# Patient Record
Sex: MALE | Race: BLACK OR AFRICAN AMERICAN | Employment: FULL TIME | URBAN - METROPOLITAN AREA
[De-identification: names, ages, dates, MRNs, and addresses within clinical notes are randomized per-mention and may not be internally consistent; named-entity substitution may affect disease eponyms.]

---

## 2020-05-26 ENCOUNTER — HOSPITAL ENCOUNTER (EMERGENCY)
Age: 50
Discharge: HOME | End: 2020-05-26
Attending: EMERGENCY MEDICINE
Payer: COMMERCIAL

## 2020-05-26 ENCOUNTER — APPOINTMENT (OUTPATIENT)
Dept: GENERAL RADIOLOGY | Age: 50
End: 2020-05-26
Attending: EMERGENCY MEDICINE
Payer: COMMERCIAL

## 2020-05-26 VITALS
TEMPERATURE: 98.1 F | BODY MASS INDEX: 32.27 KG/M2 | RESPIRATION RATE: 18 BRPM | DIASTOLIC BLOOD PRESSURE: 72 MMHG | WEIGHT: 265 LBS | SYSTOLIC BLOOD PRESSURE: 152 MMHG | HEART RATE: 67 BPM | OXYGEN SATURATION: 100 % | HEIGHT: 76 IN

## 2020-05-26 DIAGNOSIS — E87.6 HYPOKALEMIA: ICD-10-CM

## 2020-05-26 DIAGNOSIS — R07.89 ATYPICAL CHEST PAIN: Primary | ICD-10-CM

## 2020-05-26 LAB
ALBUMIN SERPL-MCNC: 3.3 G/DL (ref 3.4–5)
ALBUMIN/GLOB SERPL: 0.9 {RATIO} (ref 0.8–1.7)
ALP SERPL-CCNC: 105 U/L (ref 45–117)
ALT SERPL-CCNC: 29 U/L (ref 16–61)
ANION GAP SERPL CALC-SCNC: 7 MMOL/L (ref 3–18)
AST SERPL-CCNC: 13 U/L (ref 10–38)
BASOPHILS # BLD: 0 K/UL (ref 0–0.1)
BASOPHILS NFR BLD: 0 % (ref 0–2)
BILIRUB SERPL-MCNC: 0.4 MG/DL (ref 0.2–1)
BUN SERPL-MCNC: 17 MG/DL (ref 7–18)
BUN/CREAT SERPL: 22 (ref 12–20)
CALCIUM SERPL-MCNC: 8.7 MG/DL (ref 8.5–10.1)
CHLORIDE SERPL-SCNC: 106 MMOL/L (ref 100–111)
CK MB CFR SERPL CALC: NORMAL % (ref 0–4)
CK MB SERPL-MCNC: <1 NG/ML (ref 5–25)
CK SERPL-CCNC: 47 U/L (ref 39–308)
CO2 SERPL-SCNC: 28 MMOL/L (ref 21–32)
CREAT SERPL-MCNC: 0.79 MG/DL (ref 0.6–1.3)
DIFFERENTIAL METHOD BLD: NORMAL
EOSINOPHIL # BLD: 0.1 K/UL (ref 0–0.4)
EOSINOPHIL NFR BLD: 1 % (ref 0–5)
ERYTHROCYTE [DISTWIDTH] IN BLOOD BY AUTOMATED COUNT: 13 % (ref 11.6–14.5)
GLOBULIN SER CALC-MCNC: 3.7 G/DL (ref 2–4)
GLUCOSE SERPL-MCNC: 100 MG/DL (ref 74–99)
HCT VFR BLD AUTO: 44.6 % (ref 36–48)
HGB BLD-MCNC: 14.6 G/DL (ref 13–16)
LYMPHOCYTES # BLD: 3.5 K/UL (ref 0.9–3.6)
LYMPHOCYTES NFR BLD: 31 % (ref 21–52)
MCH RBC QN AUTO: 27.2 PG (ref 24–34)
MCHC RBC AUTO-ENTMCNC: 32.7 G/DL (ref 31–37)
MCV RBC AUTO: 83.1 FL (ref 74–97)
MONOCYTES # BLD: 1.1 K/UL (ref 0.05–1.2)
MONOCYTES NFR BLD: 10 % (ref 3–10)
NEUTS SEG # BLD: 6.5 K/UL (ref 1.8–8)
NEUTS SEG NFR BLD: 58 % (ref 40–73)
PLATELET # BLD AUTO: 268 K/UL (ref 135–420)
PMV BLD AUTO: 9.3 FL (ref 9.2–11.8)
POTASSIUM SERPL-SCNC: 3.3 MMOL/L (ref 3.5–5.5)
PROT SERPL-MCNC: 7 G/DL (ref 6.4–8.2)
RBC # BLD AUTO: 5.37 M/UL (ref 4.7–5.5)
SODIUM SERPL-SCNC: 141 MMOL/L (ref 136–145)
TROPONIN I SERPL-MCNC: <0.02 NG/ML (ref 0–0.04)
WBC # BLD AUTO: 11.3 K/UL (ref 4.6–13.2)

## 2020-05-26 PROCEDURE — 74011250637 HC RX REV CODE- 250/637: Performed by: EMERGENCY MEDICINE

## 2020-05-26 PROCEDURE — 71045 X-RAY EXAM CHEST 1 VIEW: CPT

## 2020-05-26 PROCEDURE — 93005 ELECTROCARDIOGRAM TRACING: CPT

## 2020-05-26 PROCEDURE — 80053 COMPREHEN METABOLIC PANEL: CPT

## 2020-05-26 PROCEDURE — 99284 EMERGENCY DEPT VISIT MOD MDM: CPT

## 2020-05-26 PROCEDURE — 82550 ASSAY OF CK (CPK): CPT

## 2020-05-26 PROCEDURE — 85025 COMPLETE CBC W/AUTO DIFF WBC: CPT

## 2020-05-26 RX ORDER — METOPROLOL TARTRATE 25 MG/1
25 TABLET, FILM COATED ORAL 2 TIMES DAILY
COMMUNITY

## 2020-05-26 RX ORDER — METHIMAZOLE 10 MG/1
10 TABLET ORAL DAILY
COMMUNITY

## 2020-05-26 RX ORDER — POTASSIUM CHLORIDE 1500 MG/1
20 TABLET, FILM COATED, EXTENDED RELEASE ORAL DAILY
Qty: 14 TAB | Refills: 0 | Status: SHIPPED | OUTPATIENT
Start: 2020-05-26 | End: 2020-06-09

## 2020-05-26 RX ORDER — HYDROCHLOROTHIAZIDE 25 MG/1
25 TABLET ORAL DAILY
COMMUNITY

## 2020-05-26 RX ORDER — SIMVASTATIN 40 MG/1
40 TABLET, FILM COATED ORAL
COMMUNITY

## 2020-05-26 RX ADMIN — POTASSIUM BICARBONATE 20 MEQ: 782 TABLET, EFFERVESCENT ORAL at 03:33

## 2020-05-26 NOTE — ED PROVIDER NOTES
EMERGENCY DEPARTMENT HISTORY AND PHYSICAL EXAM    Date: 5/26/2020  Patient Name: Gaurav Arambula    History of Presenting Illness     Chief Complaint   Patient presents with    Chest Pain         History Provided By: Patient    [address]  Kareem Santana is a 54 y.o. male with PMHX of hyperthyroidism, hypertension who presents to the emergency department C/O chest pain. Patient reports sharp left-sided chest pain about 3 hours prior to arrival.  States he was at rest and follow-up sharp chain in lower lateral aspect of his left chest.  Pain did not radiate. Not associated with difficulty breathing. No diaphoresis or nausea. No radiation. He denies prior episodes. Denies drug and tobacco use. PCP: Braxton Serrano, DO    Current Outpatient Medications   Medication Sig Dispense Refill    methIMAzole (TAPAZOLE) 10 mg tablet Take 10 mg by mouth daily.  metoprolol tartrate (LOPRESSOR) 25 mg tablet Take 25 mg by mouth two (2) times a day.  simvastatin (ZOCOR) 40 mg tablet Take 40 mg by mouth nightly.  hydroCHLOROthiazide (HYDRODIURIL) 25 mg tablet Take 25 mg by mouth daily.  potassium chloride SR (K-TAB) 20 mEq tablet Take 1 Tab by mouth daily for 14 days. 14 Tab 0       Past History     Past Medical History:  Past Medical History:   Diagnosis Date    Hypertension     Hypothyroidism        Past Surgical History:  History reviewed. No pertinent surgical history. Family History:  History reviewed. No pertinent family history. Social History:  Social History     Tobacco Use    Smoking status: Never Smoker    Smokeless tobacco: Never Used   Substance Use Topics    Alcohol use: Not Currently    Drug use: Never       Allergies: Allergies   Allergen Reactions    Nyquil [Doxylamin-Pse-Dm-Acetaminophen] Swelling    Pcn [Penicillins] Palpitations         Review of Systems   Review of Systems   Constitutional: Negative for chills and fever.    Respiratory: Negative for cough, chest tightness and shortness of breath. Cardiovascular: Positive for chest pain. Negative for palpitations and leg swelling. Gastrointestinal: Negative for abdominal pain, blood in stool, diarrhea, nausea and vomiting. Genitourinary: Negative for difficulty urinating and dysuria. Musculoskeletal: Negative for arthralgias and back pain. Neurological: Negative for weakness, light-headedness and headaches. Hematological: Negative for adenopathy. Does not bruise/bleed easily. Physical Exam     Vitals:    05/26/20 0149   BP: 152/72   Pulse: 67   Resp: 18   Temp: 98.1 °F (36.7 °C)   SpO2: 100%   Weight: 120.2 kg (265 lb)   Height: 6' 4\" (1.93 m)     Physical Exam  Vitals signs and nursing note reviewed. Constitutional:       General: He is not in acute distress. Appearance: He is well-developed. HENT:      Head: Normocephalic and atraumatic. Eyes:      Conjunctiva/sclera: Conjunctivae normal.      Pupils: Pupils are equal, round, and reactive to light. Neck:      Musculoskeletal: Normal range of motion and neck supple. Cardiovascular:      Rate and Rhythm: Normal rate. Rhythm irregular. Heart sounds: Normal heart sounds. No murmur. Pulmonary:      Effort: Pulmonary effort is normal. No respiratory distress. Breath sounds: Normal breath sounds. No wheezing or rales. Chest:      Chest wall: No tenderness. Abdominal:      General: There is no distension. Palpations: Abdomen is soft. Tenderness: There is no abdominal tenderness. There is no guarding or rebound. Musculoskeletal: Normal range of motion. General: No tenderness. Lymphadenopathy:      Cervical: No cervical adenopathy. Skin:     General: Skin is warm and dry. Neurological:      General: No focal deficit present. Mental Status: He is alert and oriented to person, place, and time. Cranial Nerves: No cranial nerve deficit. Motor: No abnormal muscle tone.       Coordination: Coordination normal. Psychiatric:         Mood and Affect: Mood normal.         Behavior: Behavior normal.           Diagnostic Study Results     Labs -     Recent Results (from the past 12 hour(s))   CBC WITH AUTOMATED DIFF    Collection Time: 05/26/20  2:00 AM   Result Value Ref Range    WBC 11.3 4.6 - 13.2 K/uL    RBC 5.37 4.70 - 5.50 M/uL    HGB 14.6 13.0 - 16.0 g/dL    HCT 44.6 36.0 - 48.0 %    MCV 83.1 74.0 - 97.0 FL    MCH 27.2 24.0 - 34.0 PG    MCHC 32.7 31.0 - 37.0 g/dL    RDW 13.0 11.6 - 14.5 %    PLATELET 875 909 - 781 K/uL    MPV 9.3 9.2 - 11.8 FL    NEUTROPHILS 58 40 - 73 %    LYMPHOCYTES 31 21 - 52 %    MONOCYTES 10 3 - 10 %    EOSINOPHILS 1 0 - 5 %    BASOPHILS 0 0 - 2 %    ABS. NEUTROPHILS 6.5 1.8 - 8.0 K/UL    ABS. LYMPHOCYTES 3.5 0.9 - 3.6 K/UL    ABS. MONOCYTES 1.1 0.05 - 1.2 K/UL    ABS. EOSINOPHILS 0.1 0.0 - 0.4 K/UL    ABS. BASOPHILS 0.0 0.0 - 0.1 K/UL    DF AUTOMATED     METABOLIC PANEL, COMPREHENSIVE    Collection Time: 05/26/20  2:00 AM   Result Value Ref Range    Sodium 141 136 - 145 mmol/L    Potassium 3.3 (L) 3.5 - 5.5 mmol/L    Chloride 106 100 - 111 mmol/L    CO2 28 21 - 32 mmol/L    Anion gap 7 3.0 - 18 mmol/L    Glucose 100 (H) 74 - 99 mg/dL    BUN 17 7.0 - 18 MG/DL    Creatinine 0.79 0.6 - 1.3 MG/DL    BUN/Creatinine ratio 22 (H) 12 - 20      GFR est AA >60 >60 ml/min/1.73m2    GFR est non-AA >60 >60 ml/min/1.73m2    Calcium 8.7 8.5 - 10.1 MG/DL    Bilirubin, total 0.4 0.2 - 1.0 MG/DL    ALT (SGPT) 29 16 - 61 U/L    AST (SGOT) 13 10 - 38 U/L    Alk.  phosphatase 105 45 - 117 U/L    Protein, total 7.0 6.4 - 8.2 g/dL    Albumin 3.3 (L) 3.4 - 5.0 g/dL    Globulin 3.7 2.0 - 4.0 g/dL    A-G Ratio 0.9 0.8 - 1.7     CARDIAC PANEL,(CK, CKMB & TROPONIN)    Collection Time: 05/26/20  2:00 AM   Result Value Ref Range    CK - MB <1.0 <3.6 ng/ml    CK-MB Index  0.0 - 4.0 %     CALCULATION NOT PERFORMED WHEN RESULT IS BELOW LINEAR LIMIT    CK 47 39 - 308 U/L    Troponin-I, QT <0.02 0.0 - 0.045 NG/ML   EKG, 12 LEAD, INITIAL    Collection Time: 05/26/20  2:01 AM   Result Value Ref Range    Ventricular Rate 69 BPM    Atrial Rate 69 BPM    P-R Interval 170 ms    QRS Duration 96 ms    Q-T Interval 384 ms    QTC Calculation (Bezet) 411 ms    Calculated P Axis 43 degrees    Calculated R Axis 14 degrees    Calculated T Axis 20 degrees    Diagnosis       Normal sinus rhythm  Nonspecific T wave abnormality  Abnormal ECG  No previous ECGs available     EKG, 12 LEAD, SUBSEQUENT    Collection Time: 05/26/20  3:30 AM   Result Value Ref Range    Ventricular Rate 64 BPM    Atrial Rate 64 BPM    P-R Interval 192 ms    QRS Duration 98 ms    Q-T Interval 402 ms    QTC Calculation (Bezet) 414 ms    Calculated P Axis 48 degrees    Calculated R Axis 19 degrees    Calculated T Axis 28 degrees    Diagnosis       Sinus rhythm with marked sinus arrhythmia  Otherwise normal ECG  When compared with ECG of 26-MAY-2020 02:01,  No significant change was found         Radiologic Studies -   XR CHEST PORT   Final Result   Impression:   --------------      No active cardiopulmonary disease. CT Results  (Last 48 hours)    None        CXR Results  (Last 48 hours)               05/26/20 0222  XR CHEST PORT Final result    Impression:  Impression:   --------------       No active cardiopulmonary disease. Narrative:  ---------------------------------------------------------------------------   <<<<<<<<<           [facility]           >>>>>>>>>    ---------------------------------------------------------------------------       CLINICAL HISTORY:  Chest pain. COMPARISON EXAMINATIONS:  None. ---  SINGLE FRONTAL VIEW OF THE CHEST  ---       The lungs and pleural spaces are clear. The mediastinum is unremarkable in   appearance.   No significant osseous abnormalities are identified.             --------------             Medications given in the ED-  Medications   potassium bicarb-citric acid (EFFER-K) tablet 20 mEq (20 mEq Oral Given 5/26/20 0333)         Medical Decision Making   I am the first provider for this patient. I reviewed the vital signs, available nursing notes, past medical history, past surgical history, family history and social history. Vital Signs-Reviewed the patient's vital signs. Pulse Oximetry Analysis - 100% on RA     Cardiac Monitor:  Rate: 72 bpm  Rhythm: NSR    EKG interpretation: (Preliminary)  EKG read by Dr. eCe Gallo at 0202   NSR, rate 69, normal intervals, normal axis, nonspecific TWI    EKG interpretation: (Preliminary)  EKG read by Dr. Cee Gallo at 5786   Normal sinus rhythm rate of 64 with marked sinus arrhythmia, otherwise QRS morphology unchanged      Records Reviewed: Nursing Notes and Old Medical Records    Provider Notes (Medical Decision Making): Kareem English is a 54 y.o. male here with chest pain. He reports symptoms have completely resolved since arriving in ER. PERC negative and do not suspect or have concern for PE. No concern for dissection. Procedures:  Procedures    ED Course:   3:44 AM  Patient has been chest pain-free in ER. Heart score is 3. He has hypokalemia which is being repleted in ER. Discussed with patient I will refer him to primary care and have encouraged him to follow-up with cardiology for re-stratification. Patient understands that this is not a clean [facility] health and if he feels like his symptoms are returning or getting more severe he should return to the emergency department for reevaluation. Diagnosis and Disposition     Critical Care:     DISCHARGE NOTE:    Kareem Tejada's  results have been reviewed with him. He has been counseled regarding his diagnosis, treatment, and plan. He verbally conveys understanding and agreement of the signs, symptoms, diagnosis, treatment and prognosis and additionally agrees to follow up as discussed. He also agrees with the care-plan and conveys that all of his questions have been answered. I have also provided discharge instructions for him that include: educational information regarding their diagnosis and treatment, and list of reasons why they would want to return to the ED prior to their follow-up appointment, should his condition change. He has been provided with education for proper emergency department utilization. CLINICAL IMPRESSION:    1. Atypical chest pain    2. Hypokalemia        PLAN:  1. D/C Home  2. Current Discharge Medication List      START taking these medications    Details   potassium chloride SR (K-TAB) 20 mEq tablet Take 1 Tab by mouth daily for 14 days. Qty: 14 Tab, Refills: 0           3. Follow-up Information     Follow up With Specialties Details Why Contact Info    Yoanna Nava, DO Internal Medicine Schedule an appointment as soon as possible for a visit in 2 days For primary care follow up [address] [facility] Maile Navas MD Cardiology, Internal Medicine Schedule an appointment as soon as possible for a visit  Cardiology [facility]  [address]      THE [facility] EMERGENCY DEPT Emergency Medicine  If symptoms worsen [address] [place] [id number]  [phone]        _______________________________      Please note that this dictation was completed with [email or web address], the computer voice recognition software. Quite often unanticipated grammatical, syntax, homophones, and other interpretive errors are inadvertently transcribed by the computer software. Please disregard these errors. Please excuse any errors that have escaped final proofreading.

## 2020-05-26 NOTE — DISCHARGE INSTRUCTIONS
These follow-up with your doctor. Please return to the emergency department if you develop concerning chest pain symptoms have trouble breathing or feel like your symptoms are progressing.

## 2020-05-27 LAB
ATRIAL RATE: 64 BPM
ATRIAL RATE: 69 BPM
CALCULATED P AXIS, ECG09: 43 DEGREES
CALCULATED P AXIS, ECG09: 48 DEGREES
CALCULATED R AXIS, ECG10: 14 DEGREES
CALCULATED R AXIS, ECG10: 19 DEGREES
CALCULATED T AXIS, ECG11: 20 DEGREES
CALCULATED T AXIS, ECG11: 28 DEGREES
DIAGNOSIS, 93000: NORMAL
DIAGNOSIS, 93000: NORMAL
P-R INTERVAL, ECG05: 170 MS
P-R INTERVAL, ECG05: 192 MS
Q-T INTERVAL, ECG07: 384 MS
Q-T INTERVAL, ECG07: 402 MS
QRS DURATION, ECG06: 96 MS
QRS DURATION, ECG06: 98 MS
QTC CALCULATION (BEZET), ECG08: 411 MS
QTC CALCULATION (BEZET), ECG08: 414 MS
VENTRICULAR RATE, ECG03: 64 BPM
VENTRICULAR RATE, ECG03: 69 BPM